# Patient Record
Sex: FEMALE | Race: ASIAN | Employment: STUDENT | URBAN - METROPOLITAN AREA
[De-identification: names, ages, dates, MRNs, and addresses within clinical notes are randomized per-mention and may not be internally consistent; named-entity substitution may affect disease eponyms.]

---
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
## 2024-07-12 ENCOUNTER — OFFICE VISIT (OUTPATIENT)
Dept: OBGYN CLINIC | Facility: CLINIC | Age: 24
End: 2024-07-12
Payer: COMMERCIAL

## 2024-07-12 VITALS
HEART RATE: 96 BPM | WEIGHT: 115 LBS | HEIGHT: 64 IN | DIASTOLIC BLOOD PRESSURE: 72 MMHG | SYSTOLIC BLOOD PRESSURE: 110 MMHG | BODY MASS INDEX: 19.63 KG/M2

## 2024-07-12 DIAGNOSIS — Z12.4 SCREENING FOR CERVICAL CANCER: ICD-10-CM

## 2024-07-12 DIAGNOSIS — Z01.419 WELL WOMAN EXAM WITH ROUTINE GYNECOLOGICAL EXAM: Primary | ICD-10-CM

## 2024-07-12 PROCEDURE — 88175 CYTOPATH C/V AUTO FLUID REDO: CPT | Performed by: NURSE PRACTITIONER

## 2024-07-12 PROCEDURE — 99385 PREV VISIT NEW AGE 18-39: CPT | Performed by: NURSE PRACTITIONER

## 2024-07-12 RX ORDER — VENLAFAXINE HYDROCHLORIDE 150 MG/1
CAPSULE, EXTENDED RELEASE ORAL
COMMUNITY
Start: 2024-02-08

## 2024-07-12 NOTE — PROGRESS NOTES
Subjective:  Chief Complaint   Patient presents with    Annual     24 year old female  presents for annual.    Denies current complaints    Patient's last menstrual period was 2024.  Hx Prior Abnormal Pap: No  Pap Result Notes: Never had a pap  Menarche: 14 (2024  1:25 PM)  Period Cycle (Days): 30 (2024  1:25 PM)  Period Duration (Days): 5 days (2024  1:25 PM)  Period Flow: heavy x1 tapering down (2024  1:25 PM)  Use of Birth Control (if yes, specify type): None (2024  1:25 PM)  Hx Prior Abnormal Pap: No (2024  1:25 PM)  Pap Result Notes: Never had a pap (2024  1:25 PM)    Sexually active with male and female partners in the past. Not currently sexually active  Declines STD screen  Contraception: none    Denies family history of breast, ovarian and colon CA.    Feeling safe at home.    There is no immunization history on file for this patient.   reports that she has never smoked. She has never used smokeless tobacco.   reports current alcohol use.    Past Medical History:    Asthma (HCC)     History reviewed. No pertinent surgical history.    Review of Systems:  Pertinent items are noted in the HPI.    Objective:  /72   Pulse 96   Ht 64\"   Wt 115 lb (52.2 kg)   LMP 2024   BMI 19.74 kg/m²    Physical Examination:  General appearance: Well dressed, well nourished in no apparent distress  Neurologic/Psychiatric: Alert and oriented to person, place and time, mood normal, affect appropriate  Head: Normocephalic without obvious deformity, atraumatic  Neck: No thyromegaly, supple, non-tender, no masses, no adenopathy  Lungs: Clear to auscultation bilaterally, no rales, wheezes or rhonchi  Breasts: Symmetric, non-tender, no masses, lesions, retraction, dimpling or discharge bilaterally, no axillary or supraclavicular lymphadenopathy  Heart: Regular rate and rhythm, no gallops or murmurs  Abdomen: Soft, non-tender, non-distended, no masses, no  hepatosplenomegaly, no hernias, no inguinal lymphadenopathy  Pelvic:    External genitalia- Normal, Bartholin's, urethra, skeins glands normal   Vagina- No vaginal lesions, physiologic discharge   Cervix- No lesions, long/closed, no cervical motion tenderness   Uterus- Normal sized, non-tender, no masses   Adnexa-  Non-tender, no masses  Extremities: Non-tender, full range of motion, no clubbing, cyanosis or edema  Skin:  General inspection- no rashes, lesions or discoloration  Pap smear done    Assessment/Plan:  Normal well-woman exam.  Declines STD screen  Pap smear obtained.    Patient offered chaperone for exam, declined    Diagnoses and all orders for this visit:    Well woman exam with routine gynecological exam  - self breast exam discussed and encouraged    Screening for cervical cancer  -     ThinPrep PAP with HPV Reflex Request B; Future  - ASCCP pap guidelines discussed        Return in about 1 year (around 7/12/2025) for annual well woman exam or sooner if needed.

## 2024-07-19 LAB
.: ABNORMAL
.: ABNORMAL

## 2024-08-16 ENCOUNTER — OFFICE VISIT (OUTPATIENT)
Dept: FAMILY MEDICINE CLINIC | Facility: CLINIC | Age: 24
End: 2024-08-16
Payer: COMMERCIAL

## 2024-08-16 VITALS
RESPIRATION RATE: 16 BRPM | DIASTOLIC BLOOD PRESSURE: 64 MMHG | OXYGEN SATURATION: 98 % | WEIGHT: 119 LBS | HEIGHT: 64 IN | TEMPERATURE: 98 F | SYSTOLIC BLOOD PRESSURE: 100 MMHG | BODY MASS INDEX: 20.32 KG/M2 | HEART RATE: 87 BPM

## 2024-08-16 DIAGNOSIS — Z00.00 WELL ADULT EXAM: Primary | ICD-10-CM

## 2024-08-16 DIAGNOSIS — J45.20 MILD INTERMITTENT ASTHMA IN ADULT WITHOUT COMPLICATION (HCC): ICD-10-CM

## 2024-08-16 PROCEDURE — 99385 PREV VISIT NEW AGE 18-39: CPT | Performed by: FAMILY MEDICINE

## 2024-08-16 RX ORDER — ALBUTEROL SULFATE 90 UG/1
2 AEROSOL, METERED RESPIRATORY (INHALATION) EVERY 6 HOURS PRN
Qty: 8 G | Refills: 3 | Status: CANCELLED | OUTPATIENT
Start: 2024-08-16 | End: 2025-08-16

## 2024-08-16 RX ORDER — VENLAFAXINE HYDROCHLORIDE 150 MG/1
150 CAPSULE, EXTENDED RELEASE ORAL DAILY
Qty: 90 CAPSULE | Refills: 3 | Status: SHIPPED | OUTPATIENT
Start: 2024-08-16

## 2024-08-16 NOTE — PROGRESS NOTES
Note to patient: The 21st Century Cures Act makes medical notes like these available to patients in the interest of transparency. However, be advised this is a medical document. It is intended as peer to peer communication. It is written in medical language and may contain abbreviations or verbiage that are unfamiliar. It may appear blunt or direct. Medical documents are intended to carry relevant information, facts as evident, and the clinical opinion of the practitioner.         Chief Complaint   Patient presents with    Well Adult     Patient would like pcp to take over medications     Patient signed release for vaccine record       HPI:    Patient is here for her annual physical, new patient here.     Hx of MDD- patient is on effexor 150mg daily. She has been on this for 3 years. Tolerating it well w/o any SE, she is due for refills.     Asthma- well controlled. Pt takes albuterol as needed when she goes on a hike. Denies any asthma flares. Denies wheezing/cough.       Declined pna vaccine.   Periods are regular.      Smoking: occasional weed.   Alcohol: occasioally   Drugs: weed  Sexual hx: not active   STD hx: declined  Mammogram: no fmhx of breast Ca  Colonoscopy: no fmhx of colon Ca  Pap smear: LSIL - repeat in 1 year.   Tdap: pt will find immunization record.   Diet: healthy  Exercise: regularly       Review of Systems   Constitutional: Negative for fever, chills and fatigue. No distress.  HENT: Negative for hearing loss, congestion, sore throat, neck pain   Eyes: Negative for pain and visual disturbance.   Respiratory: Negative for cough, chest tightness, shortness of breath and wheezing.    Cardiovascular: Negative for chest pain, palpitations and leg swelling.   Gastrointestinal: Negative for nausea, vomiting, abdominal pain, diarrhea, blood in stool and abdominal distention.   Genitourinary: Negative for dysuria, hematuria and difficulty urinating.      Patient Active Problem List   Diagnosis    Closed  fracture of metatarsal bone(s)    Open fracture of finger, distal phalanx    MDD (major depressive disorder)    Asthma (HCC)       Past Medical History:    Asthma (HCC)     History reviewed. No pertinent surgical history.  Family History   Problem Relation Age of Onset    Depression Father     Depression Maternal Grandfather     No Known Problems Mother      Social History     Socioeconomic History    Marital status: Single   Tobacco Use    Smoking status: Some Days     Current packs/day: 0.50     Average packs/day: 0.5 packs/day for 2.6 years (1.3 ttl pk-yrs)     Types: Cigarettes     Start date: 1/1/2022     Passive exposure: Never    Smokeless tobacco: Never   Vaping Use    Vaping status: Never Used   Substance and Sexual Activity    Alcohol use: Yes     Comment: socially    Drug use: Not Currently     Types: Cannabis    Sexual activity: Not Currently     Partners: Male, Female     Birth control/protection: Condom   Other Topics Concern    Caffeine Concern Yes     Comment: 1 cup coffee a day    Exercise Yes    Seat Belt Yes       Current Outpatient Medications   Medication Sig Dispense Refill    venlafaxine  MG Oral Capsule SR 24 Hr Take 1 capsule (150 mg total) by mouth daily. 90 capsule 3    Albuterol Sulfate  (90 Base) MCG/ACT Inhalation Aero Soln INHALE 2 PUFFS INTO THE LNGS AS NEEDED FOR 5 DAYS AS DIRECTED  3       Allergies  No Known Allergies    Health Maintenance  Immunizations:  Immunization History   Administered Date(s) Administered    Covid-19 Vaccine Pfizer 30 mcg/0.3 ml 04/06/2021, 05/04/2021, 12/22/2021    FLUZONE 6 months and older PFS 0.5 ml (58903) 12/22/2021         Physical Exam  /64   Pulse 87   Temp 98 °F (36.7 °C) (Temporal)   Resp 16   Ht 5' 4\" (1.626 m)   Wt 119 lb (54 kg)   LMP 08/09/2024   SpO2 98%   BMI 20.43 kg/m²   Constitutional: Oriented to person, place, and time. No distress.   HEENT:  Normocephalic and atraumatic. Hearing and tympanic membranes normal.   Nose normal. Oropharynx is clear and moist.   Eyes: Conjunctivae and EOM are normal. PERRLA. No scleral icterus.   Neck:  No mass and no thyromegaly present.   Cardiovascular: Normal rate, regular rhythm and intact distal pulses.  No murmur, rubs or gallops.   Pulmonary/Chest: Effort normal and breath sounds normal. No respiratory distress. No wheezes, rhonchi or rales  Abdominal: Soft. Bowel sounds are normal. Non tender, no masses, no organomegaly or hernias.  Musculoskeletal: No edema and no tenderness.    Neurological: grossly normal   Skin: Skin is warm and dry.  Psychiatric: Normal mood and affect.     A/P:    Encounter Diagnoses   Name Primary?    Well adult exam Yes    Mild intermittent asthma in adult without complication (HCC)      1. Well adult exam  - -Discussed diet and exercise, counseled on vaccine and screening guidelines.   - CBC With Differential With Platelet; Future  - Comp Metabolic Panel (14); Future  - Lipid Panel; Future    2. Mild intermittent asthma in adult without complication (HCC)  Asthma - maintenance.    Medications - albuterol 1-2 puffs every 4-6 hours as needed.  Asthma Action Plan reviewed with patient.  Patient will call if any symptoms worsen.  Patient understands and agrees to the above plan.    Orders Placed This Encounter   Procedures    CBC With Differential With Platelet    Comp Metabolic Panel (14)    Lipid Panel       Meds & Refills for this Visit:  Requested Prescriptions     Signed Prescriptions Disp Refills    venlafaxine  MG Oral Capsule SR 24 Hr 90 capsule 3     Sig: Take 1 capsule (150 mg total) by mouth daily.       Imaging & Consults:  None      No follow-ups on file.    There are no Patient Instructions on file for this visit.    All questions were answered and the patient understands the plan.     Cecy Calhoun,         This note was prepared using Dragon Medical voice recognition dictation software. As a result errors may occur. When identified these  errors have been corrected. While every attempt is made to correct errors during dictation discrepancies may still exist.      Note to patient: The 21st Century Cures Act makes medical notes like these available to patients in the interest of transparency. However, be advised this is a medical document. It is intended as peer to peer communication. It is written in medical language and may contain abbreviations or verbiage that are unfamiliar. It may appear blunt or direct. Medical documents are intended to carry relevant information, facts as evident, and the clinical opinion of the practitioner.

## 2024-08-19 RX ORDER — VENLAFAXINE HYDROCHLORIDE 150 MG/1
150 CAPSULE, EXTENDED RELEASE ORAL DAILY
Qty: 90 CAPSULE | Refills: 3 | OUTPATIENT
Start: 2024-08-19

## 2024-10-23 RX ORDER — VENLAFAXINE HYDROCHLORIDE 150 MG/1
150 CAPSULE, EXTENDED RELEASE ORAL DAILY
Qty: 90 CAPSULE | Refills: 2 | Status: SHIPPED | OUTPATIENT
Start: 2024-10-23

## 2024-10-23 NOTE — TELEPHONE ENCOUNTER
Rx sent 8/16/24 #90 with 3 refills    Patient requesting new Rx be sent to [facility] pharmacy.    Rx sent

## 2025-03-22 ENCOUNTER — PATIENT MESSAGE (OUTPATIENT)
Dept: FAMILY MEDICINE CLINIC | Facility: CLINIC | Age: 25
End: 2025-03-22

## 2025-03-24 NOTE — TELEPHONE ENCOUNTER
Requesting Albuterol inhaler  LOV: 8/16/24 Physical  RTC: 1 year  Last ACT/AAP: 8/16/24    No future appointments.    Asthma & COPD Medication Protocol [id number]/24/2025 03:48 PM   Protocol Details   Appointment in past 6 or next 3 months    Medication is active on med list    ACT Score greater than or equal to 20    ACT recorded in the last 12 months     Last ACT score 8/16/24: 25    Last refilled by previous PCP, ok to refill?

## 2025-03-25 RX ORDER — ALBUTEROL SULFATE 90 UG/1
2 INHALANT RESPIRATORY (INHALATION) EVERY 6 HOURS PRN
Qty: 8 G | Refills: 3 | Status: SHIPPED | OUTPATIENT
Start: 2025-03-25

## 2025-03-31 ENCOUNTER — PATIENT MESSAGE (OUTPATIENT)
Dept: FAMILY MEDICINE CLINIC | Facility: CLINIC | Age: 25
End: 2025-03-31

## 2025-04-01 RX ORDER — ALBUTEROL SULFATE 0.83 MG/ML
2.5 SOLUTION RESPIRATORY (INHALATION) EVERY 4 HOURS PRN
Qty: 3 ML | Refills: 3 | Status: SHIPPED | OUTPATIENT
Start: 2025-04-01

## 2025-04-01 NOTE — TELEPHONE ENCOUNTER
LOV 8/16/2025 for well adult.   Asthma- well controlled. Pt takes albuterol as needed when she goes on a hike. Denies any asthma flares. Denies wheezing/cough.      2. Mild intermittent asthma in adult without complication (HCC)  Asthma - maintenance.    Medications - albuterol 1-2 puffs every 4-6 hours as needed.  Asthma Action Plan reviewed with patient.    Patient requesting albuterol for neb treatments.   Albuterol solution is not on med list.  Okay to order?

## 2025-04-22 RX ORDER — VENLAFAXINE HYDROCHLORIDE 150 MG/1
150 CAPSULE, EXTENDED RELEASE ORAL DAILY
Qty: 90 CAPSULE | Refills: 0 | Status: SHIPPED | OUTPATIENT
Start: 2025-04-22

## 2025-04-22 NOTE — TELEPHONE ENCOUNTER
Requesting Venlafaxine 150mg  LOV: 8/16/24 Physical  RTC: not noted  Last Relevant Labs: 6/2/21  Filled: 10/23/24 #90 with 2 refills    No future appointments.    Psychiatric Non-Scheduled (Anti-Anxiety) [id number]/22/2025 03:46 PM   Protocol Details   In person appointment or virtual visit in the past 6 mos or appointment in next 3 mos    Depression Screening completed within the past 12 months    Medication is active on med list     Rx sent to pharmacy per protocol

## 2025-05-28 RX ORDER — VENLAFAXINE HYDROCHLORIDE 150 MG/1
150 CAPSULE, EXTENDED RELEASE ORAL DAILY
Qty: 90 CAPSULE | Refills: 0 | Status: SHIPPED | OUTPATIENT
Start: 2025-05-28

## 2025-05-28 NOTE — TELEPHONE ENCOUNTER
Requesting Venlafaxine 150mg  LOV: 8/16/24 Physical  RTC: not noted  Last Relevant Labs:   Filled: 4/22/25 #90 with 0 refills    No future appointments.    Psychiatric Non-Scheduled (Anti-Anxiety) [id number]/27/2025 05:15 PM   Protocol Details   In person appointment or virtual visit in the past 6 mos or appointment in next 3 mos    Depression Screening completed within the past 12 months    Medication is active on med list     Rx sent to pharmacy per protocol

## (undated) NOTE — LETTER
ASTHMA ACTION PLAN for Chanda Marsh     : 3/21/2000     Date: 2024  Provider:  Cecy Calhoun DO  Phone for doctor or clinic: [facility], [address] [place] [id number] [phone]    ACT Score: 25      You can use the colors of a traffic light to help learn about your asthma medicines.      1. Green - Go! % of Personal Best Peak Flow Use controller medicine.   Breathing is good  No cough or wheeze  Can work and play Medicine How much to take When to take it          2. Yellow - Caution. 50-79% Personal Best Peak  Flow.  Use reliever medicine to keep an asthma attack from getting bad.   Cough  Wheezing  Tight Chest  Wake up at night Medicine How much to take When to take it    Albuterol inhaler,  2 puffs every four hours as needed.         Additional instructions         3. Red - Stop! Danger!  <50% Personal Best Peak  Flow. Take these medications until  Get help from a doctor   Medicine not helping  Breathing is hard and fast  Nose opens wide  Can't walk  Ribs show  Can't talk well Medicine How much to take When to take it    Take albuterol 2 puffs every 15 minutes and go to the ER or call 911      Additional Instructions If your symptoms do not improve and you cannot contact your doctor, [facility] or [facility]!     [x] Asthma Action Plan reviewed with patient (and caregiver if necessary) and a copy of the plan was given to the patient/caregiver.   [] Asthma Action Plan reviewed with patient (and caregiver if necessary) on the phone and mailed copy to patient or submitted via [employer].     Signatures:  Provider  Cecy Calhoun DO   Patient Caretaker